# Patient Record
Sex: MALE | Race: WHITE | NOT HISPANIC OR LATINO | URBAN - METROPOLITAN AREA
[De-identification: names, ages, dates, MRNs, and addresses within clinical notes are randomized per-mention and may not be internally consistent; named-entity substitution may affect disease eponyms.]

---

## 2022-01-13 ENCOUNTER — TELEPHONE (OUTPATIENT)
Dept: URGENT CARE | Facility: CLINIC | Age: 52
End: 2022-01-13

## 2022-01-14 NOTE — TELEPHONE ENCOUNTER
Advised patient the paperwork he presented for DOT documentation was inadequate.  Reports that the [facility] requires that the provider contact the [facility] for necessary information.  Patient also reports that he does not have an ST card in his CPAP machine.

## 2022-01-20 ENCOUNTER — TELEPHONE (OUTPATIENT)
Dept: URGENT CARE | Facility: CLINIC | Age: 52
End: 2022-01-20

## 2022-01-20 NOTE — TELEPHONE ENCOUNTER
Call patient to notify him that I have left a voicemail with Dr. 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  Patient reports he will attempt to call Dr. Mart as well this afternoon or tomorrow so they can fax us the letter.

## 2022-01-20 NOTE — TELEPHONE ENCOUNTER
Left message with [facility] with [facility] Anita Mart's nurse to return call regarding DOT paperwork/letter for clearance.

## 2022-01-24 ENCOUNTER — TELEPHONE (OUTPATIENT)
Dept: URGENT CARE | Facility: CLINIC | Age: 52
End: 2022-01-24

## 2022-01-24 NOTE — TELEPHONE ENCOUNTER
Patient called regarding paperwork from his PCP for clearance for DOT physical.  Patient reports he is not sure if his primary care will be able to provide a letter.

## 2022-01-24 NOTE — TELEPHONE ENCOUNTER
Called patient and advised him to call his employer to see if they were willing to send him to a different facility for DOT physical as we are unable to obtain letter from patient's physician regarding CPAP usage, medication use, and stability of aneurysm.  Patient verbalized understanding.

## 2022-02-07 ENCOUNTER — TELEPHONE (OUTPATIENT)
Dept: URGENT CARE | Facility: CLINIC | Age: 52
End: 2022-02-07

## 2022-02-07 NOTE — TELEPHONE ENCOUNTER
Called patient regarding his PCPs letter for DOT clearance.  Discussed with patient that letter from PCP does not provide adequate information for clearance for DOT.  Patient verbalized understanding.